# Patient Record
Sex: FEMALE | Race: WHITE | HISPANIC OR LATINO | Employment: UNEMPLOYED | URBAN - METROPOLITAN AREA
[De-identification: names, ages, dates, MRNs, and addresses within clinical notes are randomized per-mention and may not be internally consistent; named-entity substitution may affect disease eponyms.]

---

## 2022-05-09 ENCOUNTER — OFFICE VISIT (OUTPATIENT)
Dept: FAMILY MEDICINE CLINIC | Facility: CLINIC | Age: 52
End: 2022-05-09

## 2022-05-09 VITALS
SYSTOLIC BLOOD PRESSURE: 126 MMHG | TEMPERATURE: 97.2 F | OXYGEN SATURATION: 99 % | HEART RATE: 90 BPM | WEIGHT: 176 LBS | DIASTOLIC BLOOD PRESSURE: 86 MMHG | RESPIRATION RATE: 18 BRPM | BODY MASS INDEX: 29.32 KG/M2 | HEIGHT: 65 IN

## 2022-05-09 DIAGNOSIS — R07.9 CHEST PAIN, UNSPECIFIED TYPE: ICD-10-CM

## 2022-05-09 DIAGNOSIS — E66.3 OVERWEIGHT (BMI 25.0-29.9): ICD-10-CM

## 2022-05-09 DIAGNOSIS — Z12.31 ENCOUNTER FOR SCREENING MAMMOGRAM FOR MALIGNANT NEOPLASM OF BREAST: ICD-10-CM

## 2022-05-09 DIAGNOSIS — Z00.00 ENCOUNTER FOR MEDICAL EXAMINATION TO ESTABLISH CARE: Primary | ICD-10-CM

## 2022-05-09 PROCEDURE — 1036F TOBACCO NON-USER: CPT

## 2022-05-09 PROCEDURE — 3008F BODY MASS INDEX DOCD: CPT

## 2022-05-09 PROCEDURE — 3725F SCREEN DEPRESSION PERFORMED: CPT

## 2022-05-09 PROCEDURE — 99204 OFFICE O/P NEW MOD 45 MIN: CPT

## 2022-05-09 NOTE — PROGRESS NOTES
Assessment/Plan:    Encounter for medical examination to establish care  Reviewed care gaps with pt & importance of completing all tasks for complete healthcare maintenance  Declined colonoscopy referral today  Reassess at next OV  -Advised pt to schedule cervical CA screening appt  Overweight (BMI 25 0-29  9)  BMI Counseling: Body mass index is 29 29 kg/m²  The BMI is above normal  Nutrition recommendations include reducing portion sizes, decreasing overall calorie intake, 3-5 servings of fruits/vegetables daily, reducing fast food intake, consuming healthier snacks, decreasing soda and/or juice intake, moderation in carbohydrate intake, increasing intake of lean protein, reducing intake of saturated fat and trans fat and reducing intake of cholesterol  Exercise recommendations include moderate aerobic physical activity for 150 minutes/week  Chest pain  Will order holter monitoring to rule out intermittent arrhythmias  -Advised the patient to bring in their medical records    -Cardiology recommendations would be appreciated  Diagnoses and all orders for this visit:    Encounter for medical examination to establish care    Encounter for screening mammogram for malignant neoplasm of breast  -     Mammo screening bilateral w cad; Future    Overweight (BMI 25 0-29 9)  -     Comprehensive metabolic panel; Future  -     CBC and differential; Future  -     Lipid panel; Future  -     TSH, 3rd generation with Free T4 reflex; Future    Chest pain, unspecified type  -     Holter monitor; Future  -     Ambulatory Referral to Cardiology; Future          Subjective:      Patient ID: Codi Randolph is a 46 y o  female  Codi Randolph is a 46 y o  female  has no past medical history on file  has a past surgical history that includes  section  She presents today to establish care  Chest Pain  Lay Suggs complains of chest pain  Onset was 1 year ago   Symptoms have been stable since that time  The patient's pain is intermittent  The patient describes the pain as pressure and does not radiate  Patient rates pain as a 8/10 in intensity  Associated symptoms are: palpitations  Aggravating factors are: none  Alleviating factors are: none  Patient's cardiac risk factors are: family history of premature cardiovascular disease and sedentary lifestyle  Patient's risk factors for DVT/PE: none  Previous cardiac testing: exercise stress test (one year ago)- negative results & multiple negative EKGs  Results are unavailable for my review  The following portions of the patient's history were reviewed and updated as appropriate: allergies, current medications, past family history, past medical history, past social history, past surgical history and problem list     Review of Systems   Constitutional: Negative for chills and fever  HENT: Negative for ear pain and sore throat  Eyes: Negative for pain and visual disturbance  Respiratory: Negative for cough and shortness of breath  Cardiovascular: Positive for chest pain  Negative for palpitations  Gastrointestinal: Negative for abdominal pain and vomiting  Genitourinary: Negative for dysuria and hematuria  Musculoskeletal: Negative for arthralgias and back pain  Skin: Negative for color change and rash  Neurological: Negative for seizures and syncope  All other systems reviewed and are negative  Objective:      /86 (BP Location: Left arm, Patient Position: Sitting, Cuff Size: Adult)   Pulse 90   Temp (!) 97 2 °F (36 2 °C) (Temporal)   Resp 18   Ht 5' 5" (1 651 m)   Wt 79 8 kg (176 lb)   LMP 03/14/2022   SpO2 99%   BMI 29 29 kg/m²          Physical Exam  Vitals and nursing note reviewed  Constitutional:       Appearance: She is overweight  HENT:      Head: Normocephalic and atraumatic        Right Ear: External ear normal       Left Ear: External ear normal       Nose: Nose normal    Eyes: Extraocular Movements: Extraocular movements intact  Conjunctiva/sclera: Conjunctivae normal       Pupils: Pupils are equal, round, and reactive to light  Cardiovascular:      Rate and Rhythm: Normal rate and regular rhythm  Pulses: Normal pulses  Heart sounds: Normal heart sounds  Pulmonary:      Effort: Pulmonary effort is normal       Breath sounds: Normal breath sounds  Abdominal:      General: Bowel sounds are normal       Palpations: Abdomen is soft  Tenderness: There is no abdominal tenderness  Musculoskeletal:         General: Normal range of motion  Cervical back: Normal range of motion  Skin:     General: Skin is warm and dry  Neurological:      General: No focal deficit present  Mental Status: She is alert and oriented to person, place, and time  Mental status is at baseline  Psychiatric:         Mood and Affect: Mood normal          Behavior: Behavior normal          Thought Content:  Thought content normal

## 2022-05-10 PROBLEM — R07.9 CHEST PAIN: Status: ACTIVE | Noted: 2022-05-10

## 2022-05-10 PROBLEM — E66.3 OVERWEIGHT (BMI 25.0-29.9): Status: ACTIVE | Noted: 2022-05-10

## 2022-05-10 PROBLEM — Z00.00 ENCOUNTER FOR MEDICAL EXAMINATION TO ESTABLISH CARE: Status: ACTIVE | Noted: 2022-05-10

## 2022-05-14 ENCOUNTER — APPOINTMENT (OUTPATIENT)
Dept: LAB | Facility: HOSPITAL | Age: 52
End: 2022-05-14
Payer: COMMERCIAL

## 2022-05-14 DIAGNOSIS — E66.3 OVERWEIGHT (BMI 25.0-29.9): ICD-10-CM

## 2022-05-14 LAB
ALBUMIN SERPL BCP-MCNC: 4.2 G/DL (ref 3–5.2)
ALP SERPL-CCNC: 84 U/L (ref 43–122)
ALT SERPL W P-5'-P-CCNC: 16 U/L
ANION GAP SERPL CALCULATED.3IONS-SCNC: 6 MMOL/L (ref 5–14)
AST SERPL W P-5'-P-CCNC: 22 U/L (ref 14–36)
BASOPHILS # BLD AUTO: 0.03 THOUSANDS/ΜL (ref 0–0.1)
BASOPHILS NFR BLD AUTO: 1 % (ref 0–1)
BILIRUB SERPL-MCNC: 1.49 MG/DL
BUN SERPL-MCNC: 11 MG/DL (ref 5–25)
CALCIUM SERPL-MCNC: 9 MG/DL (ref 8.4–10.2)
CHLORIDE SERPL-SCNC: 107 MMOL/L (ref 97–108)
CHOLEST SERPL-MCNC: 209 MG/DL
CO2 SERPL-SCNC: 28 MMOL/L (ref 22–30)
CREAT SERPL-MCNC: 0.64 MG/DL (ref 0.6–1.2)
EOSINOPHIL # BLD AUTO: 0.11 THOUSAND/ΜL (ref 0–0.61)
EOSINOPHIL NFR BLD AUTO: 2 % (ref 0–6)
ERYTHROCYTE [DISTWIDTH] IN BLOOD BY AUTOMATED COUNT: 12.7 % (ref 11.6–15.1)
GFR SERPL CREATININE-BSD FRML MDRD: 103 ML/MIN/1.73SQ M
GLUCOSE P FAST SERPL-MCNC: 101 MG/DL (ref 70–99)
HCT VFR BLD AUTO: 40.7 % (ref 34.8–46.1)
HDLC SERPL-MCNC: 90 MG/DL
HGB BLD-MCNC: 13 G/DL (ref 11.5–15.4)
IMM GRANULOCYTES # BLD AUTO: 0.02 THOUSAND/UL (ref 0–0.2)
IMM GRANULOCYTES NFR BLD AUTO: 0 % (ref 0–2)
LDLC SERPL CALC-MCNC: 109 MG/DL
LYMPHOCYTES # BLD AUTO: 1.69 THOUSANDS/ΜL (ref 0.6–4.47)
LYMPHOCYTES NFR BLD AUTO: 30 % (ref 14–44)
MCH RBC QN AUTO: 30 PG (ref 26.8–34.3)
MCHC RBC AUTO-ENTMCNC: 31.9 G/DL (ref 31.4–37.4)
MCV RBC AUTO: 94 FL (ref 82–98)
MONOCYTES # BLD AUTO: 0.52 THOUSAND/ΜL (ref 0.17–1.22)
MONOCYTES NFR BLD AUTO: 9 % (ref 4–12)
NEUTROPHILS # BLD AUTO: 3.35 THOUSANDS/ΜL (ref 1.85–7.62)
NEUTS SEG NFR BLD AUTO: 58 % (ref 43–75)
NONHDLC SERPL-MCNC: 119 MG/DL
NRBC BLD AUTO-RTO: 0 /100 WBCS
PLATELET # BLD AUTO: 333 THOUSANDS/UL (ref 149–390)
PMV BLD AUTO: 9.8 FL (ref 8.9–12.7)
POTASSIUM SERPL-SCNC: 4.5 MMOL/L (ref 3.6–5)
PROT SERPL-MCNC: 8 G/DL (ref 5.9–8.4)
RBC # BLD AUTO: 4.34 MILLION/UL (ref 3.81–5.12)
SODIUM SERPL-SCNC: 141 MMOL/L (ref 137–147)
TRIGL SERPL-MCNC: 50 MG/DL
TSH SERPL DL<=0.05 MIU/L-ACNC: 1.71 UIU/ML (ref 0.45–4.5)
WBC # BLD AUTO: 5.72 THOUSAND/UL (ref 4.31–10.16)

## 2022-05-14 PROCEDURE — 85025 COMPLETE CBC W/AUTO DIFF WBC: CPT

## 2022-05-14 PROCEDURE — 84443 ASSAY THYROID STIM HORMONE: CPT

## 2022-05-14 PROCEDURE — 36415 COLL VENOUS BLD VENIPUNCTURE: CPT

## 2022-05-14 PROCEDURE — 80061 LIPID PANEL: CPT

## 2022-05-14 PROCEDURE — 80053 COMPREHEN METABOLIC PANEL: CPT

## 2022-06-01 ENCOUNTER — TELEPHONE (OUTPATIENT)
Dept: FAMILY MEDICINE CLINIC | Facility: CLINIC | Age: 52
End: 2022-06-01

## 2022-06-06 ENCOUNTER — OFFICE VISIT (OUTPATIENT)
Dept: OBGYN CLINIC | Facility: CLINIC | Age: 52
End: 2022-06-06

## 2022-06-06 VITALS
DIASTOLIC BLOOD PRESSURE: 79 MMHG | WEIGHT: 178 LBS | HEART RATE: 80 BPM | BODY MASS INDEX: 29.62 KG/M2 | SYSTOLIC BLOOD PRESSURE: 121 MMHG

## 2022-06-06 DIAGNOSIS — Z12.31 ENCOUNTER FOR SCREENING MAMMOGRAM FOR MALIGNANT NEOPLASM OF BREAST: ICD-10-CM

## 2022-06-06 DIAGNOSIS — Z01.419 ENCOUNTER FOR ANNUAL ROUTINE GYNECOLOGICAL EXAMINATION: Primary | ICD-10-CM

## 2022-06-06 PROCEDURE — 99396 PREV VISIT EST AGE 40-64: CPT | Performed by: NURSE PRACTITIONER

## 2022-06-06 PROCEDURE — G0145 SCR C/V CYTO,THINLAYER,RESCR: HCPCS | Performed by: NURSE PRACTITIONER

## 2022-06-06 PROCEDURE — G0476 HPV COMBO ASSAY CA SCREEN: HCPCS | Performed by: NURSE PRACTITIONER

## 2022-06-06 PROCEDURE — 0503F POSTPARTUM CARE VISIT: CPT | Performed by: NURSE PRACTITIONER

## 2022-06-06 NOTE — PATIENT INSTRUCTIONS
Schedule mammogram  PAP results can take up to 2 weeks  Call with needs or concerns  Return in 1 year    COVID-19 Instructions    If you are having any of the following:  Cough   Shortness of breath   Fever  If traveled within past 2 weeks internationally or to high risk [place] states  Or been in contact with someone that has     Please call either:   Your PCP office  [phone], option [phone]    They will screen you over the phone and direct you to the nearest appropriate testing location    DO NOT go to your PCP or OB office without calling first       Tobi Franco

## 2022-06-06 NOTE — PROGRESS NOTES
Assessment/Plan     Diagnoses and all orders for this visit:    Encounter for annual routine gynecological examination  -     Liquid-based pap, screening    Encounter for screening mammogram for malignant neoplasm of breast  -     Mammo screening bilateral w 3d & cad; Future         Plan  Patient Instructions   Schedule mammogram  PAP results can take up to 2 weeks  Call with needs or concerns  Return in 1 year    Return in about 1 year (around 2023) for Annual GYN exam   Pt verbalized understanding of all discussed  Chao Rudd is a 46 y o  female who presents for annual exam  The patient has no complaints today  The patient is sexually active  1 partner x 5 years  GYN screening history: last pap: was normal and last was in the [place] and last mammogram: was normal and Pt states it was 5 years ago in the [place] with last PAP  The patient is not taking hormone replacement therapy  Patient denies post-menopausal vaginal bleeding  irregular periods prior to 2022  The patient participates in regular exercise: yes  Discussed calcium and vitamin D, to decrease the risk of bone FxThe patient reports that there is not domestic violence in her life  The patient is not having menopausal symptoms none    Depression Screening Follow-up Plan: Patient's depression screening was negative with a PHQ-2 score of 0  Their PHQ-9 score was 6  Clinically patient does not have depression  No treatment is required  Menstrual History:  OB History        2    Para   2    Term                AB        Living   2       SAB        IAB        Ectopic        Multiple        Live Births   2                Menarche age: 15  Patient's last menstrual period was 2022         The following portions of the patient's history were reviewed and updated as appropriate: allergies, current medications, past family history, past medical history, past social history, past surgical history and problem list     Review of Systems  Pertinent items are noted in HPI       Objective      /79   Pulse 80   Wt 80 7 kg (178 lb)   LMP 03/21/2022   BMI 29 62 kg/m²      General:   alert and oriented, in no acute distress, alert, appears stated age and cooperative   Heart: regular rate and rhythm, S1, S2 normal, no murmur, click, rub or gallop   Lungs: clear to auscultation bilaterally, WNL respiratory effort, negative cough or SOB   Thyroid: Negative masses palpable   Abdomen: soft, non-tender, without masses or organomegaly   Vulva: normal   Vagina: normal mucosa   Cervix: no bleeding following Pap, no cervical motion tenderness and no lesions   Uterus: normal size, non-tender, normal shape and consistency   Adnexa: normal adnexa   Breasts: MT, negative masses, discharge or dimpling         Lab Review  Mammogram and PAP ordered

## 2022-06-07 LAB
HPV HR 12 DNA CVX QL NAA+PROBE: POSITIVE
HPV16 DNA CVX QL NAA+PROBE: NEGATIVE
HPV18 DNA CVX QL NAA+PROBE: NEGATIVE

## 2022-06-10 LAB
LAB AP GYN PRIMARY INTERPRETATION: NORMAL
Lab: NORMAL

## 2022-06-13 PROBLEM — R87.810 PAPANICOLAOU SMEAR OF CERVIX WITH POSITIVE HIGH RISK HUMAN PAPILLOMA VIRUS (HPV) TEST: Status: ACTIVE | Noted: 2022-06-13

## 2022-06-22 ENCOUNTER — OFFICE VISIT (OUTPATIENT)
Dept: FAMILY MEDICINE CLINIC | Facility: CLINIC | Age: 52
End: 2022-06-22

## 2022-06-22 VITALS
RESPIRATION RATE: 18 BRPM | TEMPERATURE: 98 F | OXYGEN SATURATION: 98 % | HEIGHT: 65 IN | HEART RATE: 90 BPM | WEIGHT: 180.4 LBS | SYSTOLIC BLOOD PRESSURE: 128 MMHG | DIASTOLIC BLOOD PRESSURE: 70 MMHG | BODY MASS INDEX: 30.06 KG/M2

## 2022-06-22 DIAGNOSIS — I83.92 VARICOSE VEINS OF LEFT LOWER EXTREMITY, UNSPECIFIED WHETHER COMPLICATED: ICD-10-CM

## 2022-06-22 DIAGNOSIS — R07.9 CHEST PAIN, UNSPECIFIED TYPE: Primary | ICD-10-CM

## 2022-06-22 DIAGNOSIS — Z12.11 COLON CANCER SCREENING: ICD-10-CM

## 2022-06-22 PROCEDURE — 99213 OFFICE O/P EST LOW 20 MIN: CPT

## 2022-06-22 PROCEDURE — 3008F BODY MASS INDEX DOCD: CPT

## 2022-06-22 PROCEDURE — 3725F SCREEN DEPRESSION PERFORMED: CPT

## 2022-06-22 PROCEDURE — 1036F TOBACCO NON-USER: CPT

## 2022-06-22 NOTE — PROGRESS NOTES
Assessment/Plan:    Chest pain  Complete holter monitoring & f/u with cardiology as scheduled  Varicose veins of left lower extremity  -Recommend compression stockings, weight loss, leg elevation, exercise  Diagnoses and all orders for this visit:    Chest pain, unspecified type    Colon cancer screening  -     Cologuard    Varicose veins of left lower extremity, unspecified whether complicated          Subjective:      Patient ID: May Delgadillo is a 46 y o  female  May Delgadillo is a 46 y o  female  has no past medical history on file  has a past surgical history that includes  section  She presents today for a follow-up of intermittent chest pain  She was advised to have holter monitoring done and f/u with cardiology  She has both appts scheduled  Denies a recurrence of this for the last month  She does endorse varicose veins that are non painful but she wants to know what she can do for them  She works full time at [employer] and is on her feet all day  The following portions of the patient's history were reviewed and updated as appropriate: allergies, current medications, past family history, past medical history, past social history, past surgical history and problem list     Review of Systems   Constitutional: Negative for chills and fever  HENT: Negative for ear pain and sore throat  Eyes: Negative for pain and visual disturbance  Respiratory: Negative for cough and shortness of breath  Cardiovascular: Negative for chest pain and palpitations  Gastrointestinal: Negative for abdominal pain and vomiting  Genitourinary: Negative for dysuria and hematuria  Musculoskeletal: Negative for arthralgias and back pain  Skin: Negative for color change and rash  Neurological: Negative for seizures and syncope  All other systems reviewed and are negative          Objective:      /70 (BP Location: Right arm, Patient Position: Sitting, Cuff Size: Standard)   Pulse 90   Temp 98 °F (36 7 °C) (Temporal)   Resp 18   Ht 5' 5" (1 651 m)   Wt 81 8 kg (180 lb 6 4 oz)   LMP 03/21/2022 (Exact Date)   SpO2 98%   Breastfeeding No   BMI 30 02 kg/m²          Physical Exam  Vitals and nursing note reviewed  Constitutional:       Appearance: She is obese  HENT:      Head: Normocephalic and atraumatic  Right Ear: External ear normal       Left Ear: External ear normal       Nose: Nose normal    Eyes:      Extraocular Movements: Extraocular movements intact  Conjunctiva/sclera: Conjunctivae normal       Pupils: Pupils are equal, round, and reactive to light  Cardiovascular:      Rate and Rhythm: Normal rate and regular rhythm  Pulses: Normal pulses  Heart sounds: Normal heart sounds  Pulmonary:      Effort: Pulmonary effort is normal       Breath sounds: Normal breath sounds  Abdominal:      General: Bowel sounds are normal       Palpations: Abdomen is soft  Tenderness: There is no abdominal tenderness  Musculoskeletal:         General: Normal range of motion  Cervical back: Normal range of motion  Comments: Varicose vein present on LLE   Skin:     General: Skin is warm and dry  Neurological:      General: No focal deficit present  Mental Status: She is alert and oriented to person, place, and time  Mental status is at baseline  Psychiatric:         Mood and Affect: Mood normal          Behavior: Behavior normal          Thought Content:  Thought content normal

## 2022-06-29 ENCOUNTER — HOSPITAL ENCOUNTER (OUTPATIENT)
Dept: NON INVASIVE DIAGNOSTICS | Facility: HOSPITAL | Age: 52
Discharge: HOME | End: 2022-06-29
Payer: COMMERCIAL

## 2022-06-29 DIAGNOSIS — R07.9 CHEST PAIN, UNSPECIFIED TYPE: ICD-10-CM

## 2022-06-29 PROCEDURE — 93226 XTRNL ECG REC<48 HR SCAN A/R: CPT

## 2022-06-29 PROCEDURE — 93225 XTRNL ECG REC<48 HRS REC: CPT

## 2022-07-06 PROCEDURE — 93227 XTRNL ECG REC<48 HR R&I: CPT

## 2022-07-20 PROBLEM — E78.00 PRIMARY HYPERCHOLESTEROLEMIA: Status: ACTIVE | Noted: 2022-07-20

## 2022-07-20 PROBLEM — R94.31 ABNORMAL HOLTER MONITOR FINDING: Status: ACTIVE | Noted: 2022-07-20

## 2022-07-22 ENCOUNTER — CONSULT (OUTPATIENT)
Dept: CARDIOLOGY CLINIC | Facility: CLINIC | Age: 52
End: 2022-07-22
Payer: COMMERCIAL

## 2022-07-22 VITALS
DIASTOLIC BLOOD PRESSURE: 70 MMHG | BODY MASS INDEX: 30.22 KG/M2 | SYSTOLIC BLOOD PRESSURE: 124 MMHG | HEIGHT: 65 IN | WEIGHT: 181.4 LBS | HEART RATE: 83 BPM

## 2022-07-22 DIAGNOSIS — R07.9 CHEST PAIN, UNSPECIFIED TYPE: Primary | ICD-10-CM

## 2022-07-22 DIAGNOSIS — R94.31 ABNORMAL HOLTER MONITOR FINDING: ICD-10-CM

## 2022-07-22 DIAGNOSIS — E78.00 PRIMARY HYPERCHOLESTEROLEMIA: ICD-10-CM

## 2022-07-22 DIAGNOSIS — R00.2 PALPITATIONS: ICD-10-CM

## 2022-07-22 PROCEDURE — 99204 OFFICE O/P NEW MOD 45 MIN: CPT | Performed by: INTERNAL MEDICINE

## 2022-07-22 PROCEDURE — 3008F BODY MASS INDEX DOCD: CPT

## 2022-07-22 PROCEDURE — 93000 ELECTROCARDIOGRAM COMPLETE: CPT | Performed by: INTERNAL MEDICINE

## 2022-07-22 NOTE — PROGRESS NOTES
[facility]  [id number] [facility], [place] [address]  Phone#  [phone]   Fax#  [phone]  *-*-*-*-*-*-*-*-*-*-*-*-*-*-*-*-*-*-*-*-*-*-*-*-*-*-*-*-*-*-*-*-*-*-*-*-*-*-*-*-*-*-*-*-*-*-*-*-*-*-*-*-*-*                                              Cardiology Consultation       ENCOUNTER DATE: 22 5:05 PM  PATIENT NAME: Isela Gomez   : 1970    MRN: [id number]  AGE:51 y o  SEX: female  [address] MD     PRIMARY CARE PHYSICIAN: Wynne Nageotte, [address] [place] [address] DIAGNOSIS THIS VISIT  1  Chest pain, unspecified type  Ambulatory Referral to Cardiology    POCT ECG    NM myocardial perfusion spect (stress and/or rest)    Echo complete w/ contrast if indicated   2  Abnormal Holter monitor with dynamic T-wave changes suggestive of ischemia  NM myocardial perfusion spect (stress and/or rest)    Echo complete w/ contrast if indicated   3  Primary hypercholesterolemia  NM myocardial perfusion spect (stress and/or rest)    Echo complete w/ contrast if indicated   4  Palpitations  POCT ECG     ACTIVE PROBLEM LIST  Patient Active Problem List   Diagnosis    Encounter for medical examination to establish care    Overweight (BMI 25 0-29  9)    Chest pain    Papanicolaou smear of cervix with positive high risk human papilloma virus (HPV) test    Varicose veins of left lower extremity    Abnormal Holter monitor with dynamic T-wave changes suggestive of ischemia    Primary hypercholesterolemia       CARDIOLOGY SPECIALTY COMMENTS  Patient was referred to Cardiology by her PCP for evaluation of chest discomfort     2022 Holter monitor sinus rhythm  beats per minute and averaging 88 beats per minute  No ventricular ectopy and only 9 supraventricular ectopics  Patient did have dynamic T-wave changes with T-wave inversions noted intermittently throughout the recording  Ischemic evaluation recommended      HPI:    Patient speaks Mongolian and history obtained through translation service  Patient complains left precordial chest heaviness when she lays down at night accompanied with a racing heartbeat  It lasts for about 5 minutes and then it dissipates  She has no discomfort during the day  She complains of no dyspnea on exertion  She has no other cardiac symptoms  He is on no medications  However, she had a Holter monitor performed which demonstrated significant dynamic T-wave changes from upright to inverted and back to upright occurring throughout the day suggestive of silent ischemia  She is a lifetime nonsmoker  Her father had a heart attack in her brother had a stroke  DISCUSSION/PLAN:         Pharmacologic nuclear stress test  Echocardiogram  Return after tests have been completed  Unfortunately patient could not have stress test scheduled today due to work constraints  Test must be scheduled at 3:00 p m  or later  She will have the echocardiogram performed at 3:00 p m       Lab Studies:    Lab Results   Component Value Date    CHOLESTEROL 209 (H) 05/14/2022     Lab Results   Component Value Date    TRIG 50 05/14/2022     Lab Results   Component Value Date    HDL 90 05/14/2022     Lab Results   Component Value Date    LDLCALC 109 05/14/2022       Lab Results   Component Value Date    EGFR 103 05/14/2022    SODIUM 141 05/14/2022    K 4 5 05/14/2022    K 4 0 03/24/2015     05/14/2022     03/24/2015    CO2 28 05/14/2022    CO2 27 03/24/2015    ANIONGAP 7 03/24/2015    BUN 11 05/14/2022    BUN 6 03/24/2015    CREATININE 0 64 05/14/2022    CREATININE 0 58 (L) 03/24/2015     Lab Results   Component Value Date    WBC 5 72 05/14/2022    WBC 6 66 03/24/2015    HGB 13 0 05/14/2022    HGB 13 0 03/24/2015    HCT 40 7 05/14/2022    HCT 39 2 03/24/2015    MCV 94 05/14/2022    MCV 92 03/24/2015    MCH 30 0 05/14/2022    MCH 30 4 03/24/2015    MCHC 31 9 05/14/2022    MCHC 33 2 03/24/2015     05/14/2022     03/24/2015        Lab Results   Component Value Date    GLUCOSE 87 2015    CALCIUM 9 0 2022    CALCIUM 8 2 (L) 2015    AST 22 2022    AST 13 2015    ALT 16 2022    ALT 26 2015    ALKPHOS 84 2022    ALKPHOS 75 2015    PROT 7 9 2015    BILITOT 0 9 2015    MG 1 9 2015     Results for orders placed or performed in visit on 22   POCT ECG    Narrative    Normal sinus rhythm at a rate 83 beats per minute  Normal tracing  No current outpatient medications on file  No Known Allergies      Social History     Socioeconomic History    Marital status: Single     Spouse name: Not on file    Number of children: Not on file    Years of education: Not on file    Highest education level: Not on file   Occupational History    Not on file   Tobacco Use    Smoking status: Never Smoker    Smokeless tobacco: Never Used   Vaping Use    Vaping Use: Never used   Substance and Sexual Activity    Alcohol use: Never    Drug use: Never    Sexual activity: Not on file   Other Topics Concern    Not on file   Social History Narrative    Not on file     Social Determinants of Health     Financial Resource Strain: Not on file   Food Insecurity: No Food Insecurity    Worried About [address] [employer] in the Last Year: Never true    [facility] in the Last Year: Never true   Transportation Needs: No Transportation Needs    Lack of Transportation (Medical): No    Lack of Transportation (Non-Medical): No   Physical Activity: Not on file   Stress: Not on file   Social Connections: Not on file   Intimate Partner Violence: Not on file   Housing Stability: Not on file      Family History   Problem Relation Age of Onset    Heart disease Mother     Heart disease Father     Thrombosis Brother     Prostate cancer Brother      Past Surgical History:   Procedure Laterality Date     SECTION         Review of Systems:  Review of Systems   Constitutional: Negative  HENT: Negative  Respiratory: Negative for chest tightness and shortness of breath  Cardiovascular: Negative for chest pain and leg swelling  Gastrointestinal: Negative  Endocrine: Negative  Genitourinary: Negative  Musculoskeletal: Negative  Skin: Negative  Allergic/Immunologic: Negative  Neurological: Negative  Hematological: Negative  Psychiatric/Behavioral: Negative  Physical Exam:  /70 (BP Location: Right arm, Patient Position: Sitting, Cuff Size: Large)   Pulse 83   Ht 5' 5" (1 651 m)   Wt 82 3 kg (181 lb 6 4 oz)   BMI 30 19 kg/m²     PREVIOUS WEIGHTS:   Wt Readings from Last 10 Encounters:   07/22/22 82 3 kg (181 lb 6 4 oz)   06/22/22 81 8 kg (180 lb 6 4 oz)   06/06/22 80 7 kg (178 lb)   05/09/22 79 8 kg (176 lb)      Physical Exam  Constitutional:       General: She is not in acute distress  Appearance: She is well-developed  HENT:      Head: Normocephalic and atraumatic  Neck:      Thyroid: No thyromegaly  Vascular: No carotid bruit or JVD  Trachea: No tracheal deviation  Cardiovascular:      Rate and Rhythm: Normal rate and regular rhythm  Pulses: Normal pulses  Heart sounds: Normal heart sounds  No murmur heard  No friction rub  No gallop  Pulmonary:      Effort: Pulmonary effort is normal  No respiratory distress  Breath sounds: Normal breath sounds  No wheezing, rhonchi or rales  Chest:      Chest wall: No tenderness  Abdominal:      General: Bowel sounds are normal  There is no distension  Palpations: Abdomen is soft  Tenderness: There is no abdominal tenderness  Musculoskeletal:         General: Normal range of motion  Cervical back: Normal range of motion and neck supple  Right lower leg: No edema  Left lower leg: No edema  Skin:     General: Skin is warm and dry  Neurological:      General: No focal deficit present        Mental Status: She is alert and oriented to person, place, and time  Gait: Gait normal    Psychiatric:         Mood and Affect: Mood normal          Behavior: Behavior normal          Thought Content: Thought content normal          Judgment: Judgment normal      -======================================================   I have personally reviewed pertinent reports  I spent 60 minutes on the patient's office visit  This time was spent on the day of the visit  I had direct contact with the patient in the office on the day of the visit  Greater than 50% of the total time was spent obtaining a history, examining patient, answering all patient questions, arranging and discussing plan of care with patient as well as directly providing instructions  Additional time then spent on orders and office chart  Portions of the record may have been created with voice recognition software  Occasional wrong word or "sound a like" substitutions may have occurred due to the inherent limitations of voice recognition software  Read the chart carefully and recognize, using context, where substitutions have occurred      SIGNATURES:   Daniel Guevara MD

## 2022-11-02 ENCOUNTER — TELEPHONE (OUTPATIENT)
Dept: CARDIOLOGY CLINIC | Facility: CLINIC | Age: 52
End: 2022-11-02

## 2022-11-02 NOTE — TELEPHONE ENCOUNTER
Called pt left message pt was a no show for echo left message for pt reschedule the test before appt with Dr Wolfe Prom

## 2023-01-24 ENCOUNTER — TELEPHONE (OUTPATIENT)
Dept: ADMINISTRATIVE | Facility: OTHER | Age: 53
End: 2023-01-24

## 2023-01-24 NOTE — TELEPHONE ENCOUNTER
01/24/23 4:32 PM    The patient was called and a message was left to call the ordering provider's office regarding an open order  Thank you    Jessi Joseph  [facility]

## 2023-06-07 ENCOUNTER — ANNUAL EXAM (OUTPATIENT)
Dept: OBGYN CLINIC | Facility: CLINIC | Age: 53
End: 2023-06-07

## 2023-06-07 VITALS
SYSTOLIC BLOOD PRESSURE: 128 MMHG | BODY MASS INDEX: 29.06 KG/M2 | WEIGHT: 174.4 LBS | HEART RATE: 87 BPM | HEIGHT: 65 IN | DIASTOLIC BLOOD PRESSURE: 80 MMHG

## 2023-06-07 DIAGNOSIS — Z00.00 ENCOUNTER FOR MEDICAL EXAMINATION TO ESTABLISH CARE: ICD-10-CM

## 2023-06-07 DIAGNOSIS — Z12.31 ENCOUNTER FOR SCREENING MAMMOGRAM FOR MALIGNANT NEOPLASM OF BREAST: ICD-10-CM

## 2023-06-07 DIAGNOSIS — R87.810 PAPANICOLAOU SMEAR OF CERVIX WITH POSITIVE HIGH RISK HUMAN PAPILLOMA VIRUS (HPV) TEST: ICD-10-CM

## 2023-06-07 DIAGNOSIS — Z11.3 SCREEN FOR STD (SEXUALLY TRANSMITTED DISEASE): ICD-10-CM

## 2023-06-07 DIAGNOSIS — Z01.419 ENCOUNTER FOR ANNUAL ROUTINE GYNECOLOGICAL EXAMINATION: Primary | ICD-10-CM

## 2023-06-07 DIAGNOSIS — Z12.11 ENCOUNTER FOR SCREENING COLONOSCOPY: ICD-10-CM

## 2023-06-07 PROCEDURE — G0145 SCR C/V CYTO,THINLAYER,RESCR: HCPCS | Performed by: NURSE PRACTITIONER

## 2023-06-07 PROCEDURE — 87591 N.GONORRHOEAE DNA AMP PROB: CPT | Performed by: NURSE PRACTITIONER

## 2023-06-07 PROCEDURE — 99396 PREV VISIT EST AGE 40-64: CPT | Performed by: NURSE PRACTITIONER

## 2023-06-07 PROCEDURE — 87491 CHLMYD TRACH DNA AMP PROBE: CPT | Performed by: NURSE PRACTITIONER

## 2023-06-07 PROCEDURE — G0476 HPV COMBO ASSAY CA SCREEN: HCPCS | Performed by: NURSE PRACTITIONER

## 2023-06-07 NOTE — LETTER
2023    To Lay Suggs  : 1970      This letter is to advise you that your recent PAP SMEAR results were reviewed by me and are NORMAL    We will see you in 1 year for your annual exam     Raul Gum

## 2023-06-07 NOTE — LETTER
2023    To Kaiden Suggs  : 1970      This letter is to advise you that your recent CULTURES for gonorrhea and chlamydia were reviewed by me and are NORMAL  Please contact the office for an appointment if you have any additional concerns      JAS Bravo

## 2023-06-07 NOTE — PROGRESS NOTES
Assessment/Plan     Diagnoses and all orders for this visit:    Encounter for annual routine gynecological examination  -     Liquid-based pap, screening  -     Chlamydia/GC amplified DNA by PCR    Papanicolaou smear of cervix with positive high risk human papilloma virus (HPV) test  -     Liquid-based pap, screening    Encounter for screening mammogram for malignant neoplasm of breast  -     Mammo screening bilateral w 3d & cad; Future    Encounter for screening colonoscopy  -     Ambulatory Referral to Gastroenterology; Future    Encounter for medical examination to establish care  -     Ambulatory Referral to [facility]; Future    Screen for STD (sexually transmitted disease)  -     HIV 1/2 AG/AB w Reflex [facility] for 2 yr old and above; Future  -     RPR-Syphilis Screening (Total Syphilis IGG/IGM); Future  -     Hepatitis B surface antigen; Future  -     Hepatitis C antibody; Future         Plan  Patient Instructions   Schedule mammogram  PAP and STD results can take up to 2 weeks  Remember safe sex and condom use  Call with needs or concerns  Return in 1 year          Return in about 1 year (around 6/7/2024) for Annual GYN exam   Pt verbalized understanding of all discussed  Cipriano Gutierrez is a 46 y o  female who presents for annual exam  The patient has no complaints today  The patient is sexually active 1 partner x 6 years  GYN screening history: last pap: 6/6/2023 WNL and HPV other positive and last mammogram: No mammogram on file, pt states all have been WNL  The patient is not currently taking hormone replacement therapy  Patient denies post-menopausal vaginal bleeding    The patient participates in regular exercise: yes  The patient reports that there is not domestic violence in her life   The patient is not having menopausal symptoms none    Explained condom was found in the vagina, pt states her and her partner had intercourse 3 days ago with a condom and he was unable to find "the condom after intercourse    Depression Screening Follow-up Plan: Patient's depression screening was negative with a PHQ-2 score of 0  Their PHQ-9 score was 0  Clinically patient does not have depression  No treatment is required  P states she never had a colonoscopy, referral provided  Menstrual History:  OB History        2    Para   2    Term                AB        Living   2       SAB        IAB        Ectopic        Multiple        Live Births   2                Menarche age: 15  Patient's last menstrual period was 2023  The following portions of the patient's history were reviewed and updated as appropriate: allergies, current medications, past family history, past medical history, past social history, past surgical history and problem list     Review of Systems  Pertinent items are noted in HPI       Objective      /80   Pulse 87   Ht 5' 5\" (1 651 m)   Wt 79 1 kg (174 lb 6 4 oz)   LMP 2023   BMI 29 02 kg/m²      General:   alert and oriented, in no acute distress, alert, appears stated age and cooperative   Heart: regular rate and rhythm, S1, S2 normal, no murmur, click, rub or gallop   Lungs: clear to auscultation bilaterally, WNL respiratory effort, negative cough or SOB   Thyroid: Negative masses palpable   Abdomen: soft, non-tender, without masses or organomegaly   Vulva: normal   Vagina: normal mucosa, condom was removed from the vagina   Cervix: no bleeding following Pap, no cervical motion tenderness and no lesions   Uterus: normal size, non-tender, normal shape and consistency   Adnexa: normal adnexa   Breasts: NT, negative masses palpable, NT, negative dimpling or discharge         Lab Review  PAP with HPV co-testing collected, mammogram ordered        "

## 2023-06-07 NOTE — PATIENT INSTRUCTIONS
Schedule mammogram  PAP and STD results can take up to 2 weeks  Remember safe sex and condom use  Call with needs or concerns  Return in 1 year            Carlos Basurto

## 2023-06-12 LAB
C TRACH DNA SPEC QL NAA+PROBE: NEGATIVE
HPV HR 12 DNA CVX QL NAA+PROBE: NEGATIVE
HPV16 DNA CVX QL NAA+PROBE: NEGATIVE
HPV18 DNA CVX QL NAA+PROBE: NEGATIVE
N GONORRHOEA DNA SPEC QL NAA+PROBE: NEGATIVE

## 2023-06-13 LAB
LAB AP GYN PRIMARY INTERPRETATION: NORMAL
Lab: NORMAL

## 2023-09-20 ENCOUNTER — HOSPITAL ENCOUNTER (OUTPATIENT)
Dept: MAMMOGRAPHY | Facility: CLINIC | Age: 53
Discharge: HOME | End: 2023-09-20
Payer: COMMERCIAL

## 2023-09-20 VITALS — WEIGHT: 174 LBS | BODY MASS INDEX: 28.99 KG/M2 | HEIGHT: 65 IN

## 2023-09-20 DIAGNOSIS — Z12.31 ENCOUNTER FOR SCREENING MAMMOGRAM FOR MALIGNANT NEOPLASM OF BREAST: ICD-10-CM

## 2023-09-20 PROCEDURE — 77063 BREAST TOMOSYNTHESIS BI: CPT

## 2023-09-20 PROCEDURE — 77067 SCR MAMMO BI INCL CAD: CPT

## 2024-02-05 ENCOUNTER — TELEPHONE (OUTPATIENT)
Dept: FAMILY MEDICINE CLINIC | Facility: CLINIC | Age: 54
End: 2024-02-05

## 2024-02-05 NOTE — TELEPHONE ENCOUNTER
Patient message:Buenas tardes, mi nombre es matthew Vera. A mi número de teléfono es [phone]. Estoy llamando para programar kelsey gaudencio para hacer un físico. La graba la llamada me la puede devolver después de las 15:00 H de la tarde. Delia.      Jarrett schedule to 02/13/24

## 2024-02-20 NOTE — PROGRESS NOTES
ADULT ANNUAL PHYSICAL  [facility]    NAME: Lay Suggs  AGE: 53 y.o. SEX: female  : 1970     DATE: 2024     Assessment and Plan:     Problem List Items Addressed This Visit       Overweight (BMI 25.0-29.9) - Primary     BMI Counseling: Body mass index is 29.79 kg/m². The BMI is above normal. Nutrition recommendations include reducing portion sizes, decreasing overall calorie intake, 3-5 servings of fruits/vegetables daily, reducing fast food intake, consuming healthier snacks, decreasing soda and/or juice intake, moderation in carbohydrate intake, increasing intake of lean protein, reducing intake of saturated fat and trans fat, and reducing intake of cholesterol. Exercise recommendations include moderate aerobic physical activity for 150 minutes/week.           Primary hypercholesterolemia     Check lipid panel. Dietary counseling provided.             Other Visit Diagnoses       Diabetes mellitus screening        Relevant Orders    Basic metabolic panel    Hemoglobin A1C    Screening cholesterol level        Relevant Orders    Lipid Panel with Direct LDL reflex    Encounter for immunization        Relevant Orders    influenza vaccine, quadrivalent, 0.5 mL, preservative-free, for adult and pediatric patients 6 mos+ (AFLURIA, FLUARIX, FLULAVAL, FLUZONE) (Completed)    Zoster Vaccine Recombinant IM (Completed)    TDAP VACCINE GREATER THAN OR EQUAL TO 8YO IM (Completed)            Immunizations and preventive care screenings were discussed with patient today. Appropriate education was printed on patient's after visit summary.    Counseling:  Alcohol/drug use: discussed moderation in alcohol intake, the recommendations for healthy alcohol use, and avoidance of illicit drug use.  Exercise: the importance of regular exercise/physical activity was discussed. Recommend exercise 3-5 times per week for at least 30 minutes.        Depression Screening and Follow-up Plan: Patient was screened for depression during today's encounter. They screened negative with a PHQ-2 score of 0.        Return in about 5 months (around 7/22/2024) for shingrix - nurse visit & in 1 year w/ me.     Chief Complaint:     Chief Complaint   Patient presents with    Physical Exam      History of Present Illness:     Adult Annual Physical   Patient here for a comprehensive physical exam. The patient reports no problems.    Diet and Physical Activity  Diet/Nutrition: well balanced diet.   Exercise: no formal exercise.      Depression Screening  PHQ-2/9 Depression Screening    Little interest or pleasure in doing things: 0 - not at all  Feeling down, depressed, or hopeless: 0 - not at all  PHQ-2 Score: 0  PHQ-2 Interpretation: Negative depression screen       General Health  Sleep: sleeps well and gets 7-8 hours of sleep on average.   Hearing: normal - bilateral.  Vision: most recent eye exam <1 year ago and wears glasses.   Dental: regular dental visits, brushes teeth twice daily, and flosses teeth occasionally.       /GYN Health  Follows with gynecology? yes   Patient is: perimenopausal  Last menstrual period: November of 2023   Review of Systems:     Review of Systems   Constitutional:  Negative for chills and fever.   HENT:  Negative for ear pain and sore throat.    Eyes:  Negative for pain and visual disturbance.   Respiratory:  Negative for cough and shortness of breath.    Cardiovascular:  Negative for chest pain and palpitations.   Gastrointestinal:  Negative for abdominal pain and vomiting.   Genitourinary:  Negative for dysuria and hematuria.   Musculoskeletal:  Negative for arthralgias and back pain.   Skin:  Negative for color change and rash.   Neurological:  Negative for seizures and syncope.   All other systems reviewed and are negative.     Past Medical History:     History reviewed. No pertinent past medical history.   Past Surgical History:      Past Surgical History:   Procedure Laterality Date     SECTION        Social History:     Social History     Socioeconomic History    Marital status: Single     Spouse name: None    Number of children: None    Years of education: None    Highest education level: None   Occupational History    None   Tobacco Use    Smoking status: Never     Passive exposure: Never    Smokeless tobacco: Never   Vaping Use    Vaping status: Never Used   Substance and Sexual Activity    Alcohol use: Yes    Drug use: Never    Sexual activity: Yes     Partners: Male   Other Topics Concern    None   Social History Narrative    None     Social Determinants of Health     Financial Resource Strain: Low Risk  (2024)    Overall Financial Resource Strain (CARDIA)     Difficulty of Paying Living Expenses: Not hard at all   Food Insecurity: No Food Insecurity (2024)    Hunger Vital Sign     Worried About Running Out of Food in the Last Year: Never true     Ran Out of Food in the Last Year: Never true   Transportation Needs: No Transportation Needs (2024)    PRAPARE - Transportation     Lack of Transportation (Medical): No     Lack of Transportation (Non-Medical): No   Physical Activity: Not on file   Stress: Not on file   Social Connections: Not on file   Intimate Partner Violence: Not on file   Housing Stability: Not on file      Family History:     Family History   Problem Relation Age of Onset    Heart disease Mother     Heart disease Father     No Known Problems Sister     No Known Problems Daughter     No Known Problems Maternal Grandmother     No Known Problems Maternal Grandfather     No Known Problems Paternal Grandmother     No Known Problems Paternal Grandfather     Thrombosis Brother     Prostate cancer Brother       Current Medications:     No current outpatient medications on file.     No current facility-administered medications for this visit.      Allergies:     No Known Allergies   Physical Exam:     BP  "120/80 (BP Location: Right arm, Patient Position: Sitting, Cuff Size: Standard)   Pulse 83   Temp 97.7 °F (36.5 °C) (Temporal)   Resp 16   Ht 5' 5\" (1.651 m)   Wt 81.2 kg (179 lb)   LMP 11/05/2023   SpO2 99%   Breastfeeding No   BMI 29.79 kg/m²     Physical Exam  Vitals and nursing note reviewed.   Constitutional:       General: She is not in acute distress.     Appearance: She is well-developed.   HENT:      Head: Normocephalic and atraumatic.   Eyes:      Conjunctiva/sclera: Conjunctivae normal.   Cardiovascular:      Rate and Rhythm: Normal rate and regular rhythm.      Heart sounds: No murmur heard.  Pulmonary:      Effort: Pulmonary effort is normal. No respiratory distress.      Breath sounds: Normal breath sounds.   Abdominal:      Palpations: Abdomen is soft.      Tenderness: There is no abdominal tenderness.   Musculoskeletal:         General: No swelling.      Cervical back: Neck supple.   Skin:     General: Skin is warm and dry.      Capillary Refill: Capillary refill takes less than 2 seconds.   Neurological:      Mental Status: She is alert.   Psychiatric:         Mood and Affect: Mood normal.          JAS Rojas  [facility]      "

## 2024-02-22 ENCOUNTER — OFFICE VISIT (OUTPATIENT)
Dept: FAMILY MEDICINE CLINIC | Facility: CLINIC | Age: 54
End: 2024-02-22

## 2024-02-22 VITALS
HEIGHT: 65 IN | DIASTOLIC BLOOD PRESSURE: 80 MMHG | HEART RATE: 83 BPM | SYSTOLIC BLOOD PRESSURE: 120 MMHG | RESPIRATION RATE: 16 BRPM | BODY MASS INDEX: 29.82 KG/M2 | WEIGHT: 179 LBS | TEMPERATURE: 97.7 F | OXYGEN SATURATION: 99 %

## 2024-02-22 DIAGNOSIS — E78.00 PRIMARY HYPERCHOLESTEROLEMIA: ICD-10-CM

## 2024-02-22 DIAGNOSIS — Z13.1 DIABETES MELLITUS SCREENING: ICD-10-CM

## 2024-02-22 DIAGNOSIS — Z13.220 SCREENING CHOLESTEROL LEVEL: ICD-10-CM

## 2024-02-22 DIAGNOSIS — E66.3 OVERWEIGHT (BMI 25.0-29.9): Primary | ICD-10-CM

## 2024-02-22 DIAGNOSIS — Z23 ENCOUNTER FOR IMMUNIZATION: ICD-10-CM

## 2024-02-22 PROBLEM — R07.9 CHEST PAIN: Status: RESOLVED | Noted: 2022-05-10 | Resolved: 2024-02-22

## 2024-02-22 PROBLEM — Z00.00 ENCOUNTER FOR MEDICAL EXAMINATION TO ESTABLISH CARE: Status: RESOLVED | Noted: 2022-05-10 | Resolved: 2024-02-22

## 2024-02-22 PROCEDURE — 90715 TDAP VACCINE 7 YRS/> IM: CPT

## 2024-02-22 PROCEDURE — 90686 IIV4 VACC NO PRSV 0.5 ML IM: CPT

## 2024-02-22 PROCEDURE — 99214 OFFICE O/P EST MOD 30 MIN: CPT

## 2024-02-22 PROCEDURE — 90472 IMMUNIZATION ADMIN EACH ADD: CPT

## 2024-02-22 PROCEDURE — 90471 IMMUNIZATION ADMIN: CPT

## 2024-02-22 PROCEDURE — 90750 HZV VACC RECOMBINANT IM: CPT

## 2024-02-22 NOTE — ASSESSMENT & PLAN NOTE
BMI Counseling: Body mass index is 29.79 kg/m². The BMI is above normal. Nutrition recommendations include reducing portion sizes, decreasing overall calorie intake, 3-5 servings of fruits/vegetables daily, reducing fast food intake, consuming healthier snacks, decreasing soda and/or juice intake, moderation in carbohydrate intake, increasing intake of lean protein, reducing intake of saturated fat and trans fat, and reducing intake of cholesterol. Exercise recommendations include moderate aerobic physical activity for 150 minutes/week.

## 2024-06-12 ENCOUNTER — TELEPHONE (OUTPATIENT)
Dept: OBGYN CLINIC | Facility: CLINIC | Age: 54
End: 2024-06-12

## 2024-09-04 ENCOUNTER — ANNUAL EXAM (OUTPATIENT)
Dept: OBGYN CLINIC | Facility: CLINIC | Age: 54
End: 2024-09-04

## 2024-09-04 VITALS
SYSTOLIC BLOOD PRESSURE: 108 MMHG | HEIGHT: 65 IN | DIASTOLIC BLOOD PRESSURE: 70 MMHG | HEART RATE: 97 BPM | WEIGHT: 175.2 LBS | BODY MASS INDEX: 29.19 KG/M2

## 2024-09-04 DIAGNOSIS — Z12.31 ENCOUNTER FOR SCREENING MAMMOGRAM FOR MALIGNANT NEOPLASM OF BREAST: ICD-10-CM

## 2024-09-04 DIAGNOSIS — Z01.419 ENCOUNTER FOR ANNUAL ROUTINE GYNECOLOGICAL EXAMINATION: Primary | ICD-10-CM

## 2024-09-04 DIAGNOSIS — Z12.11 ENCOUNTER FOR SCREENING COLONOSCOPY: ICD-10-CM

## 2024-09-04 PROCEDURE — 99396 PREV VISIT EST AGE 40-64: CPT | Performed by: NURSE PRACTITIONER

## 2024-09-04 PROCEDURE — 3725F SCREEN DEPRESSION PERFORMED: CPT | Performed by: NURSE PRACTITIONER

## 2024-09-04 NOTE — PATIENT INSTRUCTIONS
Schedule mammogram after 9/20/2024  Schedule GI referral to discuss colonoscopy  Call with needs or concerns  Return in 1 year

## 2024-09-04 NOTE — PROGRESS NOTES
Assessment/Plan     Diagnoses and all orders for this visit:    Encounter for annual routine gynecological examination    Encounter for screening mammogram for malignant neoplasm of breast  -     Mammo screening bilateral w 3d and cad; Future    Encounter for screening colonoscopy  -     Ambulatory Referral to Gastroenterology; Future         Plan  Patient Instructions   Schedule mammogram after 2024  Schedule GI referral to discuss colonoscopy  Call with needs or concerns  Return in 1 year  Return in about 1 year (around 2025) for Annual GYN exam.  Pt verbalized understanding of all discussed.      Maggi Suggs is a 53 y.o. female who presents for annual exam. The patient has no complaints today. The patient is not sexually active. GYN screening history: last pap: approximate date 2023 and negative HPV and was normal and last mammogram: approximate date 2023 and was normal. The patient is not taking hormone replacement therapy.  Pt states she has periods every 3-4 months last was May 2024 . The patient participates in regular exercise: yes. Discussed the importance of calcium, vitamin D and exercise to decrease the risk of bone Fx after menopause. The patient reports that there is not domestic violence in her life. The patient is not having menopausal symptoms none  Never had a colonoscopy, patient said she never went. Pt agreed to have a referral to GI, referral provide    Depression Screening Follow-up Plan: Patient's depression screening was negative with a PHQ-2 score of 0. Their PHQ-9 score was 0. Clinically patient does not have depression. No treatment is required.       Menstrual History:  OB History          2    Para   2    Term   2       0    AB   0    Living   2         SAB   0    IAB   0    Ectopic   0    Multiple   0    Live Births   2                Menarche age: 14  Patient's last menstrual period was 2024 (approximate).       The  "following portions of the patient's history were reviewed and updated as appropriate: allergies, current medications, past family history, past medical history, past social history, past surgical history, and problem list.    Review of Systems  Pertinent items are noted in HPI.     Objective      /70 (BP Location: Left arm, Patient Position: Sitting, Cuff Size: Standard)   Pulse 97   Ht 5' 5\" (1.651 m)   Wt 79.5 kg (175 lb 3.2 oz)   LMP 05/20/2024 (Approximate)   BMI 29.15 kg/m²      General:   alert and oriented, in no acute distress, alert, appears stated age, and cooperative   Heart: NSR   Lungs: clear to auscultation bilaterally, WNL respiratory effort, negative cough or SOB   Thyroid: Negative masses palpable   Abdomen: soft, non-tender, without masses or organomegaly, nagtive masses palpable   Vulva: normal   Vagina: normal mucosa   Cervix: no cervical motion tenderness and no lesions   Uterus: normal size, non-tender, normal shape and consistency   Adnexa: normal adnexa   Breasts: NT negative masses palpable, negative discharge and dimpling          Lab Review  Mammogram ordered        "

## 2025-01-01 ENCOUNTER — HOSPITAL ENCOUNTER (EMERGENCY)
Facility: HOSPITAL | Age: 55
End: 2025-04-01
Attending: EMERGENCY MEDICINE

## 2025-01-01 ENCOUNTER — APPOINTMENT (EMERGENCY)
Dept: RADIOLOGY | Facility: HOSPITAL | Age: 55
End: 2025-01-01

## 2025-01-01 ENCOUNTER — HOSPITAL ENCOUNTER (EMERGENCY)
Facility: HOSPITAL | Age: 55
Discharge: HOME | End: 2025-03-31
Attending: EMERGENCY MEDICINE

## 2025-01-01 VITALS
WEIGHT: 174.16 LBS | BODY MASS INDEX: 28.98 KG/M2 | OXYGEN SATURATION: 98 % | RESPIRATION RATE: 18 BRPM | HEART RATE: 85 BPM | TEMPERATURE: 98.2 F | SYSTOLIC BLOOD PRESSURE: 136 MMHG | DIASTOLIC BLOOD PRESSURE: 65 MMHG

## 2025-01-01 VITALS — OXYGEN SATURATION: 75 % | TEMPERATURE: 97.3 F

## 2025-01-01 DIAGNOSIS — Z97.8 ENDOTRACHEALLY INTUBATED: ICD-10-CM

## 2025-01-01 DIAGNOSIS — R10.9 ABDOMINAL PAIN: ICD-10-CM

## 2025-01-01 DIAGNOSIS — R09.02 HYPOXIA: ICD-10-CM

## 2025-01-01 DIAGNOSIS — M25.531 RIGHT WRIST PAIN: Primary | ICD-10-CM

## 2025-01-01 DIAGNOSIS — I46.9 CARDIAC ARREST (HCC): Primary | ICD-10-CM

## 2025-01-01 DIAGNOSIS — G56.01 CARPAL TUNNEL SYNDROME OF RIGHT WRIST: ICD-10-CM

## 2025-01-01 LAB
ATRIAL RATE: 107 BPM
ATRIAL RATE: 152 BPM
ATRIAL RATE: 163 BPM
FLUAV RNA RESP QL NAA+PROBE: NEGATIVE
FLUBV RNA RESP QL NAA+PROBE: NEGATIVE
GLUCOSE SERPL-MCNC: 98 MG/DL (ref 65–140)
P AXIS: 66 DEGREES
P AXIS: 73 DEGREES
PR INTERVAL: 114 MS
PR INTERVAL: 232 MS
QRS AXIS: 79 DEGREES
QRS AXIS: 79 DEGREES
QRS AXIS: 80 DEGREES
QRSD INTERVAL: 76 MS
QRSD INTERVAL: 78 MS
QRSD INTERVAL: 80 MS
QT INTERVAL: 266 MS
QT INTERVAL: 268 MS
QT INTERVAL: 280 MS
QTC INTERVAL: 357 MS
QTC INTERVAL: 422 MS
QTC INTERVAL: 458 MS
RSV RNA RESP QL NAA+PROBE: NEGATIVE
SARS-COV-2 RNA RESP QL NAA+PROBE: NEGATIVE
T WAVE AXIS: 29 DEGREES
T WAVE AXIS: 38 DEGREES
T WAVE AXIS: 46 DEGREES
URATE SERPL-MCNC: 4.4 MG/DL (ref 2–7.5)
VENTRICULAR RATE: 107 BPM
VENTRICULAR RATE: 152 BPM
VENTRICULAR RATE: 161 BPM

## 2025-01-01 PROCEDURE — 73080 X-RAY EXAM OF ELBOW: CPT

## 2025-01-01 PROCEDURE — 73110 X-RAY EXAM OF WRIST: CPT

## 2025-01-01 PROCEDURE — 99285 EMERGENCY DEPT VISIT HI MDM: CPT | Performed by: EMERGENCY MEDICINE

## 2025-01-01 PROCEDURE — 96365 THER/PROPH/DIAG IV INF INIT: CPT

## 2025-01-01 PROCEDURE — 73130 X-RAY EXAM OF HAND: CPT

## 2025-01-01 PROCEDURE — 94760 N-INVAS EAR/PLS OXIMETRY 1: CPT

## 2025-01-01 PROCEDURE — 36415 COLL VENOUS BLD VENIPUNCTURE: CPT | Performed by: EMERGENCY MEDICINE

## 2025-01-01 PROCEDURE — 93005 ELECTROCARDIOGRAM TRACING: CPT

## 2025-01-01 PROCEDURE — 99291 CRITICAL CARE FIRST HOUR: CPT

## 2025-01-01 PROCEDURE — 84550 ASSAY OF BLOOD/URIC ACID: CPT | Performed by: EMERGENCY MEDICINE

## 2025-01-01 PROCEDURE — 82948 REAGENT STRIP/BLOOD GLUCOSE: CPT

## 2025-01-01 PROCEDURE — 94002 VENT MGMT INPAT INIT DAY: CPT

## 2025-01-01 PROCEDURE — 0241U HB NFCT DS VIR RESP RNA 4 TRGT: CPT | Performed by: EMERGENCY MEDICINE

## 2025-01-01 PROCEDURE — 99283 EMERGENCY DEPT VISIT LOW MDM: CPT

## 2025-01-01 PROCEDURE — 96372 THER/PROPH/DIAG INJ SC/IM: CPT

## 2025-01-01 RX ORDER — GABAPENTIN 100 MG/1
300 CAPSULE ORAL 3 TIMES DAILY
Qty: 63 CAPSULE | Refills: 0 | Status: SHIPPED | OUTPATIENT
Start: 2025-01-01 | End: 2025-04-02

## 2025-01-01 RX ORDER — ACETAMINOPHEN 325 MG/1
975 TABLET ORAL ONCE
Status: COMPLETED | OUTPATIENT
Start: 2025-01-01 | End: 2025-01-01

## 2025-01-01 RX ORDER — IPRATROPIUM BROMIDE AND ALBUTEROL SULFATE 2.5; .5 MG/3ML; MG/3ML
3 SOLUTION RESPIRATORY (INHALATION) ONCE
Status: DISCONTINUED | OUTPATIENT
Start: 2025-01-01 | End: 2025-01-01 | Stop reason: HOSPADM

## 2025-01-01 RX ORDER — KETOROLAC TROMETHAMINE 30 MG/ML
15 INJECTION, SOLUTION INTRAMUSCULAR; INTRAVENOUS ONCE
Status: DISCONTINUED | OUTPATIENT
Start: 2025-01-01 | End: 2025-01-01 | Stop reason: HOSPADM

## 2025-01-01 RX ORDER — CALCIUM CHLORIDE 100 MG/ML
SYRINGE (ML) INTRAVENOUS CODE/TRAUMA/SEDATION MEDICATION
Status: COMPLETED | OUTPATIENT
Start: 2025-01-01 | End: 2025-01-01

## 2025-01-01 RX ORDER — HYDROCORTISONE SODIUM SUCCINATE 100 MG/2ML
100 INJECTION INTRAMUSCULAR; INTRAVENOUS ONCE
Status: DISCONTINUED | OUTPATIENT
Start: 2025-01-01 | End: 2025-01-01 | Stop reason: HOSPADM

## 2025-01-01 RX ORDER — TRAMADOL HYDROCHLORIDE 50 MG/1
50 TABLET ORAL ONCE
Status: COMPLETED | OUTPATIENT
Start: 2025-01-01 | End: 2025-01-01

## 2025-01-01 RX ORDER — GABAPENTIN 100 MG/1
100 CAPSULE ORAL ONCE
Status: COMPLETED | OUTPATIENT
Start: 2025-01-01 | End: 2025-01-01

## 2025-01-01 RX ORDER — EPINEPHRINE 0.1 MG/ML
INJECTION INTRAVENOUS CODE/TRAUMA/SEDATION MEDICATION
Status: COMPLETED | OUTPATIENT
Start: 2025-01-01 | End: 2025-01-01

## 2025-01-01 RX ORDER — SODIUM BICARBONATE 84 MG/ML
INJECTION, SOLUTION INTRAVENOUS CODE/TRAUMA/SEDATION MEDICATION
Status: COMPLETED | OUTPATIENT
Start: 2025-01-01 | End: 2025-01-01

## 2025-01-01 RX ORDER — KETOROLAC TROMETHAMINE 30 MG/ML
30 INJECTION, SOLUTION INTRAMUSCULAR; INTRAVENOUS ONCE
Status: COMPLETED | OUTPATIENT
Start: 2025-01-01 | End: 2025-01-01

## 2025-01-01 RX ADMIN — EPINEPHRINE 1 MG: 0.1 INJECTION INTRAVENOUS at 10:23

## 2025-01-01 RX ADMIN — CALCIUM CHLORIDE 1 G: 100 INJECTION INTRAVENOUS; INTRAVENTRICULAR at 10:27

## 2025-01-01 RX ADMIN — EPINEPHRINE 1 MG: 0.1 INJECTION INTRAVENOUS at 10:28

## 2025-01-01 RX ADMIN — EPINEPHRINE 5 MCG/MIN: 1 INJECTION, SOLUTION, CONCENTRATE INTRAVENOUS at 11:05

## 2025-01-01 RX ADMIN — DEXAMETHASONE SODIUM PHOSPHATE 10 MG: 10 INJECTION, SOLUTION INTRAMUSCULAR; INTRAVENOUS at 23:34

## 2025-01-01 RX ADMIN — GABAPENTIN 100 MG: 100 CAPSULE ORAL at 23:34

## 2025-01-01 RX ADMIN — EPINEPHRINE 1 MG: 0.1 INJECTION INTRAVENOUS at 10:34

## 2025-01-01 RX ADMIN — NOREPINEPHRINE BITARTRATE 10 MCG/MIN: 1 INJECTION, SOLUTION, CONCENTRATE INTRAVENOUS at 10:50

## 2025-01-01 RX ADMIN — ACETAMINOPHEN 975 MG: 325 TABLET, FILM COATED ORAL at 23:34

## 2025-01-01 RX ADMIN — ALTEPLASE 50 MG: KIT at 10:37

## 2025-01-01 RX ADMIN — EPINEPHRINE 1 MG: 0.1 INJECTION INTRAVENOUS at 10:31

## 2025-01-01 RX ADMIN — EPINEPHRINE 1 MG: 0.1 INJECTION INTRAVENOUS at 10:37

## 2025-01-01 RX ADMIN — ALTEPLASE 50 MG: KIT at 10:53

## 2025-01-01 RX ADMIN — EPINEPHRINE 1 MG: 0.1 INJECTION INTRAVENOUS at 10:25

## 2025-01-01 RX ADMIN — SODIUM BICARBONATE 50 MEQ: 84 INJECTION INTRAVENOUS at 10:26

## 2025-01-01 RX ADMIN — EPINEPHRINE 1 MG: 0.1 INJECTION INTRAVENOUS at 10:40

## 2025-01-01 RX ADMIN — EPINEPHRINE 1 MG: 0.1 INJECTION INTRAVENOUS at 11:05

## 2025-01-01 RX ADMIN — SODIUM CHLORIDE 1000 ML: 0.9 INJECTION, SOLUTION INTRAVENOUS at 10:28

## 2025-01-01 RX ADMIN — EPINEPHRINE 1 MG: 0.1 INJECTION INTRAVENOUS at 11:02

## 2025-01-01 RX ADMIN — EPINEPHRINE 1 MG: 0.1 INJECTION INTRAVENOUS at 10:58

## 2025-01-01 RX ADMIN — TRAMADOL HYDROCHLORIDE 50 MG: 50 TABLET, COATED ORAL at 01:16

## 2025-01-01 RX ADMIN — KETOROLAC TROMETHAMINE 30 MG: 30 INJECTION, SOLUTION INTRAMUSCULAR; INTRAVENOUS at 23:34

## 2025-01-28 NOTE — ASSESSMENT & PLAN NOTE
BMI Counseling: Body mass index is 29 29 kg/m²  The BMI is above normal  Nutrition recommendations include reducing portion sizes, decreasing overall calorie intake, 3-5 servings of fruits/vegetables daily, reducing fast food intake, consuming healthier snacks, decreasing soda and/or juice intake, moderation in carbohydrate intake, increasing intake of lean protein, reducing intake of saturated fat and trans fat and reducing intake of cholesterol  Exercise recommendations include moderate aerobic physical activity for 150 minutes/week 
Reviewed care gaps with pt & importance of completing all tasks for complete healthcare maintenance  Declined colonoscopy referral today  Reassess at next OV  -Advised pt to schedule cervical CA screening appt 
Will order holter monitoring to rule out intermittent arrhythmias  -Advised the patient to bring in their medical records    -Cardiology recommendations would be appreciated 
no

## 2025-03-30 NOTE — Clinical Note
Lay Suggs was seen and treated in our emergency department on 3/30/2025.        Other - See Comments    restrictions to be determined by orthopedics    Diagnosis:     Lay  may return to work on return date.    She may return on this date: 04/07/2025         If you have any questions or concerns, please don't hesitate to call.      Kelle King, DO    ______________________________           _______________          _______________  Hospital Representative                              Date                                Time

## 2025-03-31 NOTE — ED PROVIDER NOTES
Time reflects when diagnosis was documented in both MDM as applicable and the Disposition within this note       Time User Action Codes Description Comment    3/31/2025 12:34 AM Kelle iKng [M25.531] Right wrist pain     3/31/2025 12:34 AM Kelle King [G56.01] Carpal tunnel syndrome of right wrist           ED Disposition       ED Disposition   Discharge    Condition   Stable    Date/Time   Mon Mar 31, 2025 12:47 AM    Comment   Lay Suggs discharge to home/self care.                   Assessment & Plan       Medical Decision Making    Differential : gout, arthritis, fracture, dislocation, carpal tunnel syndrome versus cubital tunnel syndrome versus tendinitis versus bursitis.  Doubt DVT and arterial occlusion.  Did also consider cervical radiculopathy    Amount and/or Complexity of Data Reviewed  Independent Historian: spouse  External Data Reviewed: notes.  Labs: ordered. Decision-making details documented in ED Course.     Details:   Uric acid within normal range    Radiology: ordered and independent interpretation performed. Decision-making details documented in ED Course.     Details:     X-ray of right hand and wrist on my read shows no acute fracture or dislocation.  Some degenerative changes noted    X-ray of right elbow part a high concern for possible fracture.  There is no obvious fracture on my view no obvious fracture        Risk  OTC drugs.  Prescription drug management.        ED Course as of 03/31/25 0051   Sun Mar 30, 2025   2320 Patient is a 54 year old female Hungarian speaking only coming in with right wrist and hand pain starting yesterday. On exam patient appear in pain but no obvious injury or deformity. Patient repeatedly pulls arm away during exam limiting exam.  Will obtain uric acid however will also obtain x-rays and give multimodal medication.  Discussed with patient that will help decrease pain but may not be able to take away completely      Disclosure: Voice  "to text software was used in the preparation of this document and could have resulted in translational errors.      Occasional wrong word or \"sound a like\" substitutions may have occurred due to the inherent limitations of voice recognition software.  Read the chart carefully and recognize, using context, where substitutions have occurred.       I have independently reviewed external records are available to me to the level of detail possible within the time constraints of my patient care responsibilities in the ED.       Mon Mar 31, 2025   0010 Uric acid  WNL       0032 Xrays reviewed - no obvious fracture .  A I possible fracture in elbow.  Patient has no bony tenderness at the elbow and has full pronation and supination that does not have any pain in the elbow but only pain in the wrist.    Given symptoms will place in splint/Velcro wrist, referral to hand as well as work note.       [id number] [id number]    Patient states her pain has not improved. Updated on labs, xrays as well. Discussed with her regarding follow up with PCP and orthopedics.  Will give her dose of tramadol before she goes.  Discussed with patient regarding follow-up with PCP, stretches and ice.  Patient remains neuro intact.  Did consider acute arterial occlusion versus DVT however based on history and physical doubtful.      Disclosure: Voice to text software was used in the preparation of this document and could have resulted in translational errors.      Occasional wrong word or \"sound a like\" substitutions may have occurred due to the inherent limitations of voice recognition software.  Read the chart carefully and recognize, using context, where substitutions have occurred.       I have independently reviewed external records are available to me to the level of detail possible within the time constraints of my patient care responsibilities in the ED.           Medications   traMADol (ULTRAM) tablet 50 mg (has no administration in time range) "   gabapentin (NEURONTIN) capsule 100 mg (100 mg Oral Given 3/30/25 2334)   ketorolac (TORADOL) injection 30 mg (30 mg Intramuscular Given 3/30/25 2334)   acetaminophen (TYLENOL) tablet 975 mg (975 mg Oral Given 3/30/25 2334)   dexamethasone oral liquid 10 mg 1 mL (10 mg Oral Given 3/30/25 2334)       ED Risk Strat Scores                            SBIRT 22yo+      Flowsheet Row Most Recent Value   Initial Alcohol Screen: US AUDIT-C     1. How often do you have a drink containing alcohol? 0 Filed at: 2025   2. How many drinks containing alcohol do you have on a typical day you are drinking?  0 Filed at: 2025   3a. Male UNDER 65: How often do you have five or more drinks on one occasion? 0 Filed at: 2025   3b. FEMALE Any Age, or MALE 65+: How often do you have 4 or more drinks on one occassion? 0 Filed at: 2025   Audit-C Score 0 Filed at: 2025   JAYLEN: How many times in the past year have you...    Used an illegal drug or used a prescription medication for non-medical reasons? Never Filed at: 2025                            History of Present Illness       Chief Complaint   Patient presents with    Arm Pain     Patient c/o right arm pain that started suddenly yesterday afternoon. Patient currently denies any other symptoms.        No past medical history on file.   Past Surgical History:   Procedure Laterality Date     SECTION        Family History   Problem Relation Age of Onset    Heart disease Mother     Heart disease Father     No Known Problems Sister     No Known Problems Daughter     No Known Problems Maternal Grandmother     No Known Problems Maternal Grandfather     No Known Problems Paternal Grandmother     No Known Problems Paternal Grandfather     Thrombosis Brother     Prostate cancer Brother       Social History     Tobacco Use    Smoking status: Never     Passive exposure: Never    Smokeless tobacco: Never   Vaping Use    Vaping  "status: Never Used   Substance Use Topics    Alcohol use: Yes    Drug use: Never      E-Cigarette/Vaping    E-Cigarette Use Never User       E-Cigarette/Vaping Substances    Nicotine No     THC No     CBD No     Flavoring No     Other No     Unknown No       I have reviewed and agree with the history as documented.     Patient is a 54-year-old female Azeri-speaking only here with her  with no significant past medical history coming in today with nontraumatic right hand pain.  Patient states that she had pain that started yesterday around 1 PM.  There was no trauma or injury.  She had no pain in the right shoulder elbow.  She states it is \"a constant 12 out of 10 pain\".  When I asked her to describe the pain she states that \"all of the above\".  She denies any headache, neck pain, shoulder pain.  She denies any pain throughout the left upper extremity.  She states that she does work and at work she does a lot of gripping and carrying things.  Predominantly right-handed.  She denies any focal weakness and/or paresthesias throughout the bilateral upper extremities.  She denies any chest pain, tenderness, headache.  She did take over-the-counter medications with no relief.      History provided by:  Patient, medical records and spouse   used: Yes ([employer] [id number])    Arm Pain  Onset quality:  Gradual  Duration:  2 days  Timing:  Constant  Progression:  Unchanged  Chronicity:  New  Ineffective treatments:  APAP, Motrin  Associated symptoms: no abdominal pain, no chest pain, no congestion, no cough, no ear pain, no fever, no rash, no shortness of breath, no sore throat and no vomiting        Review of Systems   Constitutional: Negative.  Negative for chills and fever.   HENT: Negative.  Negative for congestion, ear pain and sore throat.    Eyes: Negative.  Negative for pain and visual disturbance.   Respiratory: Negative.  Negative for cough and shortness of breath.    Cardiovascular: Negative.  " Negative for chest pain and palpitations.   Gastrointestinal: Negative.  Negative for abdominal pain and vomiting.   Endocrine: Negative.    Genitourinary: Negative.  Negative for dysuria and hematuria.   Musculoskeletal: Negative.  Negative for arthralgias and back pain.   Skin:  Negative for color change and rash.   Neurological: Negative.  Negative for seizures and syncope.   Hematological: Negative.    Psychiatric/Behavioral: Negative.     All other systems reviewed and are negative.          Objective       ED Triage Vitals   Temperature Pulse Blood Pressure Respirations SpO2 Patient Position - Orthostatic VS   03/30/25 2257 03/30/25 2257 03/30/25 2257 03/30/25 2257 03/30/25 2259 --   98.2 °F (36.8 °C) 100 146/84 18 100 %       Temp Source Heart Rate Source BP Location FiO2 (%) Pain Score    03/30/25 2257 -- -- -- 03/30/25 2259    Oral    10 - Worst Possible Pain      Vitals      Date and Time Temp Pulse SpO2 Resp BP Pain Score FACES Pain Rating User   03/30/25 2334 -- -- -- -- -- 10 - Worst Possible Pain -- KA   03/30/25 2259 -- -- 100 % -- -- 10 - Worst Possible Pain -- ZC   03/30/25 2257 98.2 °F (36.8 °C) 100 -- 18 146/84 -- -- ZC            Physical Exam  Vitals and nursing note reviewed.   Constitutional:       General: She is awake. She is not in acute distress.     Appearance: Normal appearance. She is well-developed and normal weight.   HENT:      Head: Normocephalic and atraumatic.      Left Ear: External ear normal.      Nose: Nose normal.      Mouth/Throat:      Mouth: Mucous membranes are moist.   Eyes:      Extraocular Movements: Extraocular movements intact.      Conjunctiva/sclera: Conjunctivae normal.      Pupils: Pupils are equal, round, and reactive to light.   Neck:     Cardiovascular:      Rate and Rhythm: Normal rate and regular rhythm.      Pulses:           Radial pulses are 2+ on the right side and 2+ on the left side.      Heart sounds: No murmur heard.  Pulmonary:      Effort:  Pulmonary effort is normal. No respiratory distress.      Breath sounds: Normal breath sounds.   Abdominal:      Palpations: Abdomen is soft.      Tenderness: There is no abdominal tenderness.   Musculoskeletal:         General: No swelling.      Right shoulder: Normal.      Left shoulder: Normal.      Right upper arm: Normal.      Left upper arm: Normal.      Right elbow: Normal.      Left elbow: Normal.      Right forearm: Normal.      Left forearm: Normal.      Right wrist: Tenderness and bony tenderness present. No swelling, deformity, effusion, lacerations or snuff box tenderness. Decreased range of motion.      Left wrist: Normal.      Right hand: Normal.      Left hand: Normal.        Arms:       Cervical back: Neck supple.      Comments: Noted patient has full active range of motion of left shoulder, elbow and wrist pain-free manual muscle grade 5 out of 5    Patient has full active range of motion of the right shoulder and elbow.    There is limited active range of motion as well as passive range of motion of right wrist and fingers due to patient keeps pulling away.  Was able to passively range all fingers without any restriction or cogwheeling.     Skin:     General: Skin is warm and dry.      Capillary Refill: Capillary refill takes less than 2 seconds.   Neurological:      General: No focal deficit present.      Mental Status: She is alert and oriented to person, place, and time.      GCS: GCS eye subscore is 4. GCS verbal subscore is 5. GCS motor subscore is 6.      Cranial Nerves: Cranial nerves 2-12 are intact.      Sensory: Sensation is intact.      Motor: Motor function is intact.      Coordination: Coordination is intact.   Psychiatric:         Mood and Affect: Mood normal.         Behavior: Behavior is cooperative.         Results Reviewed       Procedure Component Value Units Date/Time    Uric acid [[id number]]  (Normal) Collected: 03/30/25 2996    Lab Status: Final result Specimen: Blood from  Arm, Left Updated: 03/30/25 6896     Uric Acid 4.4 mg/dL     Narrative:      N-acetyl-p-benzoquinone imine (metabolite of Acetaminophen) will generate erroneously low results in samples for patients that have taken an overdose of Acetaminophen.            XR wrist 3+ views RIGHT   ED Interpretation by Kelle King DO (03/31 0032)   No obvious fracture or dislocation.  Some degenerative changes noted      XR hand 3+ views RIGHT   ED Interpretation by Kelle King DO (03/31 0031)   Some degenerative changes.  No fracture or dislocation      XR elbow 3+ vw RIGHT    (Results Pending)       Procedures    ED Medication and Procedure Management   None     Patient's Medications   Discharge Prescriptions    DICLOFENAC SODIUM (VOLTAREN) 1 %    Apply 2 g topically 4 (four) times a day       Start Date: 3/31/2025 End Date: --       Order Dose: 2 g       Quantity: 2 g    Refills: 0    GABAPENTIN (NEURONTIN) 100 MG CAPSULE    Take 3 capsules (300 mg total) by mouth 3 (three) times a day for 7 days For post-herpetic neuralgia: Take 1 tablet on day 1,  Then take 2 tablets on day 2, Then take 3 tablets on day 3 and every day after that as instructed by your doctor.       Start Date: 3/31/2025 End Date: 4/7/2025       Order Dose: 300 mg       Quantity: 63 capsule    Refills: 0     No discharge procedures on file.  ED SEPSIS DOCUMENTATION   Time reflects when diagnosis was documented in both MDM as applicable and the Disposition within this note       Time User Action Codes Description Comment    3/31/2025 12:34 AM Kelle King [M25.531] Right wrist pain     3/31/2025 12:34 AM Kelle King [G56.01] Carpal tunnel syndrome of right wrist                  Kelle King DO  03/31/25 0051

## 2025-03-31 NOTE — DISCHARGE INSTRUCTIONS
1. [place] se discutió, thao radiografías no mostraron ninguna lesión  2. Le dieron medicamentos que pueden causarle somnolencia: no conduzca ni tome decisiones importantes  3. Llame a bullock médico de cabecera y ortopedia para un seguimiento  4. Use hielo en la dorinda y comience los ejercicios de estiramiento

## 2025-04-01 NOTE — ED PROVIDER NOTES
Time reflects when diagnosis was documented in both MDM as applicable and the Disposition within this note       Time User Action Codes Description Comment    2025 11:33 AM Gordon Villarreal [I46.9] Cardiac arrest (HCC)     2025  1:16 PM Gordon Villarreal [R10.9] Abdominal pain     2025  1:16 PM Gordon Villarreal [R09.02] Hypoxia     2025  1:16 PM Gordon Villarreal [Z97.8] Endotracheally intubated           ED Disposition       ED Disposition       Condition   --    Date/Time    11:33 AM    Comment   --             Assessment & Plan       Medical Decision Making  Differential is broad but includes but is not limited to intra-abdominal process such as gastritis, colitis, appendicitis, obstruction, pancreatitis, dissection, pulm embolism, viral syndrome, pneumonia, intracranial process such as ICH could cause abdominal pain.  Also will consider ACS, CHF.  Will give DuoNeb since patient is audibly wheezing.  Will take for pulmonary embolism study with abdomen pelvis as well as CT of the head.  Will evaluate for any signs of electrolyte abnormality with labs and cardiac markers.      I was urgently called into the patient's room, had significantly worsened.  She was not responsive to verbal or painful stimuli.  Patient was urgently bagged.  Had copious sputum in her airway.  She was a difficult airway and required multiple attempts.  Patient became progressively bradycardic refractory to atropine to the point where she went into asystolic cardiac arrest.  Once patient went into cardiac arrest I immediately began CPR.  Patient had a difficult airway and was filled with copious secretions.  She was eventually successfully intubated by assisting ED attending.  Please see MAR for code dosing.  Patient was not a dialysis patient so low suspicion for hyperkalemia.  Calcium and bicarbonate was given without improvement.  EKG was not consistent with acute MI.  Also on the differential  was thromboembolism.  Family noted history of clots but could not further elaborate.  Denied any medicines for this.  2 doses of thrombolytics were given along with continued CPR.  Assisting ED attending performed FAST exam, did not note any free fluid in the abdomen.  Several minutes after the first dose of thrombolytics were given, ROSC was obtained.  Emergent crash central line was placed by myself and the right femoral vein with assistance of ultrasound.  Patient was in significant shock, pulses were palpated by ED team but we do not have reliable blood pressures.  Bedside ultrasound ultrasound was performed multiple times and patient had poor cardiac contractility when she did have cardiac function.  There was no evidence of tamponade, no significant pericardial effusion.  Vasopressors were initiated and were rapidly uptitrated.  Please see MAR for details..  Patient went to cardiac arrest multiple times which was visualized by ultrasound with lack of cardiac activity and swelling in the right ventricle.  Family was present, had a discussion with them.  Given the prognosis of being severe with the amount of times that she had gone into cardiac arrest, length of cardiac arrest, potential for anoxic injury -daughter was in agreement with the plan of not continuing CPR again given that this was medically futile in my opinion.  They understood that if we were to continue with CPR that there was high chance of anoxic brain injury and she would not be if she was previously.  There were no shockable rhythms detected on the monitor at any time.  Sepsis antibiotics were ordered as well as a third vasopressor.  We rapidly escalated catecholamine based vasopressors about ability to sustain a detectable blood pressure.  Patient ultimately did go back into cardiac arrest, this was confirmed by bedside ultrasound with no cardiac activity, swelling in the right ventricle.  Uncertain etiology of cause for cardiac arrest.  Labs  were not collected.  Time of death was called at 11:27 AM on/1/25.      Critical Care Time Statement: Upon my evaluation, this patient had a high probability of imminent or life-threatening deterioration due to sob, unresponsiveness, cardiac arrest, which required my direct attention, intervention, and personal management.  I spent a total of 50 minutes directly providing critical care services, including interpretation of complex medical databases, evaluating for the presence of life-threatening injuries or illnesses, management of organ system failure(s) , complex medical decision making (to support/prevent further life-threatening deterioration)., interpretation of hemodynamic data, titration of vasoactive medications, and titration of continuous IV medications (drips). This time is exclusive of procedures, teaching, treating other patients, family meetings, and any prior time recorded by providers other than myself.       Amount and/or Complexity of Data Reviewed  Independent Historian: [email or web address]  External Data Reviewed: labs and notes.  Labs: ordered. Decision-making details documented in ED Course.  Radiology: ordered.  ECG/medicine tests:  Decision-making details documented in ED Course.    Risk  Prescription drug management.        ED Course as of 04/01/25 1417   Tue Apr 01, 2025   1133 Time of death 1127.  Updated family   1143 ECG 12 lead   1144 ECG 12 lead  I independently reviewed EKG.  Sinus tachycardia, heart rate 152.  Normal axis, intervals normal.  No STEMI       Medications   ipratropium-albuterol (DUO-NEB) 0.5-2.5 mg/3 mL inhalation solution 3 mL ( Nebulization Canceled Entry 4/1/25 1127)   ketorolac (TORADOL) injection 15 mg ( Intravenous Canceled Entry 4/1/25 1127)   vasopressin (PITRESSIN) 20 Units in sodium chloride 0.9 % 100 mL infusion ( Intravenous Canceled Entry 4/1/25 1127)   EPINEPHrine 5,000 mcg (STANDARD CONCENTRATION) IV in sodium chloride 0.9% 250 mL (5 mcg/min Intravenous New Bag  25 1105)   cefepime (MAXIPIME) 2 g/50 mL dextrose IVPB ( Intravenous Canceled Entry 25 1127)   vancomycin (VANCOCIN) 2,000 mg in sodium chloride 0.9 % 500 mL IVPB ( Intravenous Canceled Entry 25 1127)   lactated ringers bolus 250 mL ( Intravenous Canceled Entry 25 1127)   hydrocortisone (Solu-CORTEF) injection 100 mg ( Intravenous Canceled Entry 25 1127)   EPINEPHrine (ADRENALIN) injection (1 mg Intravenous Given 25 1105)   sodium bicarbonate 50 mEq/50 mL injection (50 mEq Intravenous Given 25 1026)   calcium chloride 1 g/10 mL injection (1 g Intravenous Given 25 1027)   sodium chloride 0.9 % bolus (1,000 mL Intravenous New Bag 25 1028)   alteplase (ACTIVASE) injection (50 mg Intravenous Given 25 1053)   norepinephrine (LEVOPHED) 4 mg in sodium chloride 0.9 % 250 mL infusion (50 mcg/min  Rate/Dose Change 25 1117)       ED Risk Strat Scores                                                History of Present Illness       Chief Complaint   Patient presents with    Abdominal Pain     Pt reports abd pain, chills, nausea, vomiting, and diarrhea for several days.        No past medical history on file.   Past Surgical History:   Procedure Laterality Date     SECTION        Family History   Problem Relation Age of Onset    Heart disease Mother     Heart disease Father     No Known Problems Sister     No Known Problems Daughter     No Known Problems Maternal Grandmother     No Known Problems Maternal Grandfather     No Known Problems Paternal Grandmother     No Known Problems Paternal Grandfather     Thrombosis Brother     Prostate cancer Brother       Social History     Tobacco Use    Smoking status: Never     Passive exposure: Never    Smokeless tobacco: Never   Vaping Use    Vaping status: Never Used   Substance Use Topics    Alcohol use: Yes    Drug use: Never      E-Cigarette/Vaping    E-Cigarette Use Never User       E-Cigarette/Vaping Substances    Nicotine No     THC  No     CBD No     Flavoring No     Other No     Unknown No       I have reviewed and agree with the history as documented.     54-year-old female presenting for abdominal pain, shortness of breath.  Started at 7 AM with abdominal pain.  Also complaining of finger tingling and several digits right hand. Hx limited due to SOB and acuity of medical condition.      History provided by:  Relative, medical records and patient   used: Yes    Abdominal Pain      Review of Systems   Gastrointestinal:  Positive for abdominal pain.           Objective       ED Triage Vitals [04/01/25 0936]   Temperature Pulse Blood Pressure Respirations SpO2 Patient Position - Orthostatic VS   (!) 97.3 °F (36.3 °C) (!) 155 105/64 20 98 % --      Temp Source Heart Rate Source BP Location FiO2 (%) Pain Score    Oral Monitor Left arm -- --      Vitals      Date and Time Temp Pulse SpO2 Resp BP Pain Score FACES Pain Rating User   04/01/25 1127 -- -- -- 0 0/0 -- --    04/01/25 1127 -- 0 -- -- -- -- --    04/01/25 1126 -- -- -- -- 108/69 -- -- TS 04/01/25 1126 -- -- -- -- 108/69 -- -- TS 04/01/25 1124 -- 95 -- 16 -- -- -- TS 04/01/25 1121 -- 104 -- 16 -- -- -- TS 04/01/25 1118 -- 113 -- 16 -- -- -- TS 04/01/25 1115 -- -- -- -- 59/34 -- -- TS 04/01/25 1115 -- 122 -- 16 -- -- -- TS 04/01/25 1114 -- -- -- -- 59/34 -- -- TS 04/01/25 1112 -- 126 -- 16 -- -- -- TS 04/01/25 1109 -- 130 75 % 16 -- -- -- TS 04/01/25 1106 -- 107 84 % 16 -- -- -- TS 04/01/25 1103 -- 118 79 % 123 -- -- -- TS 04/01/25 1101 -- -- -- -- 89/37 -- -- TS 04/01/25 1101 -- -- -- -- 173/83 -- -- TS 04/01/25 1100 -- 76 73 % 20 -- -- -- TS 04/01/25 1059 -- -- -- -- 89/37 -- -- TS 04/01/25 1057 -- 153 70 % 16 -- -- -- TS 04/01/25 1054 -- 175 -- -- -- -- -- TS 04/01/25 1054 -- 174 64 % 16 -- -- -- TS 04/01/25 1051 -- 177 51 % 16 -- -- -- TS 04/01/25 1050 -- 170 -- -- -- -- -- TS 04/01/25 1048 -- 171 52 % 16 -- -- -- TS    04/01/25 1045 -- 152 -- -- -- -- --    04/01/25 1045 -- 72 -- -- -- -- --    04/01/25 1045 -- 151 21 % 23 -- -- --    04/01/25 1042 -- 149 45 % 134 -- -- --    04/01/25 1039 -- 155 64 % 103 -- -- --    04/01/25 1036 -- 163 75 % 124 -- -- --    04/01/25 1033 -- 146 42 % 119 -- -- --    04/01/25 1030 -- 185 83 % 116 -- -- --    04/01/25 1027 -- 214 50 % 100 -- -- --    04/01/25 1024 -- 171 53 % 95 -- -- --    04/01/25 1021 -- 35 64 % 30 -- -- --    04/01/25 0936 97.3 °F (36.3 °C) 155 98 % 20 105/64 -- -- HMR            Physical Exam  Vitals and nursing note reviewed.   Constitutional:       General: She is in acute distress.      Appearance: She is well-developed. She is ill-appearing.   HENT:      Head: Normocephalic and atraumatic.   Eyes:      Conjunctiva/sclera: Conjunctivae normal.   Cardiovascular:      Rate and Rhythm: Regular rhythm. Tachycardia present.      Heart sounds: Normal heart sounds. No murmur heard.  Pulmonary:      Effort: Tachypnea present.      Breath sounds: Rales present.   Abdominal:      General: Abdomen is protuberant.      Palpations: Abdomen is soft.      Tenderness: There is abdominal tenderness.   Musculoskeletal:         General: No swelling.      Cervical back: Neck supple.      Right lower leg: No edema.      Left lower leg: No edema.   Skin:     General: Skin is warm and dry.      Capillary Refill: Capillary refill takes less than 2 seconds.   Neurological:      General: No focal deficit present.      Mental Status: She is alert.      GCS: GCS eye subscore is 4. GCS verbal subscore is 5. GCS motor subscore is 6.      Cranial Nerves: No dysarthria or facial asymmetry.      Motor: No abnormal muscle tone or seizure activity.      Coordination: Finger-Nose-Finger Test normal.   Psychiatric:         Mood and Affect: Mood is anxious.         Results Reviewed       Procedure Component Value Units Date/Time    FLU/RSV/COVID - if FLU/RSV clinically relevant (2hr TAT)  [[id number]]  (Normal) Collected: 04/01/25 1012    Lab Status: Final result Specimen: Nares from Nose Updated: 04/01/25 1058     SARS-CoV-2 Negative     INFLUENZA A PCR Negative     INFLUENZA B PCR Negative     RSV PCR Negative    Narrative:      This test has been performed using the CoV-2/Flu/RSV plus assay on the [employer] GeneXpert platform. This test has been validated by the  and verified by the performing laboratory.     This test is designed to amplify and detect the following: nucleocapsid (N), envelope (E), and RNA-dependent RNA polymerase (RdRP) genes of the SARS-CoV-2 genome; matrix (M), basic polymerase (PB2), and acidic protein (PA) segments of the influenza A genome; matrix (M) and non-structural protein (NS) segments of the influenza B genome, and the nucleocapsid genes of RSV A and RSV B.     Positive results are indicative of the presence of Flu A, Flu B, RSV, and/or SARS-CoV-2 RNA. Positive results for SARS-CoV-2 or suspected novel influenza should be reported to state, local, or federal health departments according to local reporting requirements.      All results should be assessed in conjunction with clinical presentation and other laboratory markers for clinical management.     FOR PEDIATRIC PATIENTS - copy/paste COVID Guidelines URL to browser: [email or web address]       HS Troponin 0hr (reflex protocol) [[id number]]     Lab Status: No result Specimen: Blood     Fingerstick Glucose (POCT) [[id number]]  (Normal) Collected: 04/01/25 1003    Lab Status: Final result Specimen: Blood Updated: 04/01/25 1004     POC Glucose 98 mg/dl     UA (URINE) with reflex to Scope [[id number]]     Lab Status: No result Specimen: Urine             CT pe study w abdomen pelvis w contrast    (Results Pending)   CT head wo contrast    (Results Pending)       Central Line    Date/Time: 4/1/2025 11:44 AM    Performed by: Gordon Villarreal    Authorized by: Gordon Villarreal DO    Patient location:  ED  Consent:     Consent obtained:  Emergent situation  Universal protocol:     Patient identity confirmed:  Hospital-assigned identification number, anonymous protocol, patient vented/unresponsive and provided demographic data  Pre-procedure details:     Hand hygiene: Hand hygiene performed prior to insertion      Use of maximum sterile barriers during central venous catheter insertion: Attempted as best as possible, patient was intermittently in cardiac arrest.    Indications:     Central line indications: medications requiring central line and no peripheral vascular access    Anesthesia (see MAR for exact dosages):     Anesthesia method:  None  Procedure details:     Location:  Right femoral    Vessel type: vein      Laterality:  Right    Approach: percutaneous technique used      Patient position:  Flat    Catheter type:  Triple lumen 20cm    Catheter size:  7.5 Fr    Landmarks identified: yes      Ultrasound guidance: yes      Ultrasound image availability:  Not obtained due to urgency    Number of attempts:  1    Successful placement: yes      Catheter tip vessel location comment:  Free blood flow through all ports.  No imaging was able to be obtained given emergency  Post-procedure details:     Post-procedure:  Dressing applied and line sutured    Assessment:  Blood return through all ports    Post-procedure complications: none      Patient tolerance of procedure:  Tolerated well, no immediate complications  IO Line    Date/Time: 4/1/2025 1:16 PM    Performed by: Gordon Villarreal DO  Authorized by: Gordon Villarreal DO    Patient location:  ED  Consent:     Consent obtained:  Emergent situation  Universal protocol:     Patient identity confirmed:  Anonymous protocol, patient vented/unresponsive  Indication:     Indications: clinical deterioration, hemodynamic instability, fluid administration, medication  administration and rapid vascular access    Procedure details:     Insertion site:  Proximal tibia    Laterality:  Left    Insertion device:  15 gauge IO needle and drill device    IO Size:  25mm (blue)    Insertion: Needle was inserted through the bony cortex      Number of attempts:  1    Successful placement: yes      Insertion confirmation:  Aspiration of blood/marrow, easy infusion of fluids and stability of the needle  Post-procedure details:     Patient tolerance of procedure:  Tolerated well, no immediate complications  POC Cardiac US    Date/Time: 4/2/2025 10:42 AM    Performed by: Gordon Villarreal DO  Authorized by: Gordon Villarreal DO    Patient location:  ED  Procedure details:     Exam Type:  Diagnostic    Indications: cardiac arrest      Assessment / Evaluation for: cardiac function, pericardial effusion and right heart strain (suspected pulmonary embolism)      Exam Type: initial exam      Image availability:  Not obtained due to urgency  Interpretation:      Multiple ultrasounds were performed.  Patient recorded cardiac function.  Right ventricle was mildly enlarged but no septal bowing.  No pericardial effusion.    During periods of rest, no cardiac function, there was swelling in the right ventricle    B-lines noted bilaterally      ED Medication and Procedure Management   Prior to Admission Medications   Prescriptions Last Dose Informant Patient Reported? Taking?   Diclofenac Sodium (VOLTAREN) 1 %   No No   Sig: Apply 2 g topically 4 (four) times a day   gabapentin (Neurontin) 100 mg capsule   No No   Sig: Take 3 capsules (300 mg total) by mouth 3 (three) times a day for 7 days For post-herpetic neuralgia: Take 1 tablet on day 1,  Then take 2 tablets on day 2, Then take 3 tablets on day 3 and every day after that as instructed by your doctor.      Facility-Administered Medications: None     Patient's Medications   Discharge Prescriptions    No medications on file     No  discharge procedures on file.  ED SEPSIS DOCUMENTATION   Time reflects when diagnosis was documented in both MDM as applicable and the Disposition within this note       Time User Action Codes Description Comment    4/1/2025 11:33 AM Gordon Villarreal [I46.9] Cardiac arrest (HCC)     4/1/2025  1:16 PM Gordon Villarreal [R10.9] Abdominal pain     4/1/2025  1:16 PM Gordon Villarreal [R09.02] Hypoxia     4/1/2025  1:16 PM Gordon Villarreal [Z97.8] Endotracheally intubated                  Gordon Villarreal, DO  04/01/25 1317       Gordon Villarreal, DO  04/01/25 1414       Gordon Villarreal, DO  04/01/25 1417       Gordon Villarreal, DO  04/02/25 1044

## 2025-04-01 NOTE — ED PROCEDURE NOTE
PROCEDURE  Intubation    Date/Time: 4/1/2025 10:25 AM    Performed by: Cole Monge MD  Authorized by: Cole Monge MD    Patient location:  ED  Pre-procedure details:     Patient status:  Unresponsive  Indications:     Indications for intubation: airway protection    Procedure details:     Preoxygenation:  Bag valve mask    CPR in progress: yes      Intubation method:  Oral    Oral intubation technique:  Direct    Laryngoscope blade:  Mac 4    Tube size (mm):  7.5    Tube type:  Cuffed    Number of attempts:  1    Ventilation between attempts: yes      Cricoid pressure: yes      Tube visualized through cords: yes    Placement assessment:     Tube secured with:  ETT pinon    Breath sounds:  Equal and absent over the epigastrium    Placement verification: chest rise, direct visualization, equal breath sounds and ETCO2 detector    Comments:      Code leader Dr. Villarreal, CPR in progress, airway secured by me        Cole Monge MD  04/01/25 2135